# Patient Record
Sex: FEMALE | Race: WHITE | NOT HISPANIC OR LATINO | Employment: UNEMPLOYED | URBAN - METROPOLITAN AREA
[De-identification: names, ages, dates, MRNs, and addresses within clinical notes are randomized per-mention and may not be internally consistent; named-entity substitution may affect disease eponyms.]

---

## 2024-03-07 ENCOUNTER — OFFICE VISIT (OUTPATIENT)
Dept: OBSTETRICS AND GYNECOLOGY | Facility: CLINIC | Age: 24
End: 2024-03-07
Payer: COMMERCIAL

## 2024-03-07 VITALS
BODY MASS INDEX: 36.09 KG/M2 | HEIGHT: 65 IN | WEIGHT: 216.6 LBS | SYSTOLIC BLOOD PRESSURE: 138 MMHG | DIASTOLIC BLOOD PRESSURE: 100 MMHG

## 2024-03-07 DIAGNOSIS — Z30.46 ENCOUNTER FOR REMOVAL AND REINSERTION OF NEXPLANON: Primary | ICD-10-CM

## 2024-03-07 LAB — PREGNANCY TEST URINE, POC: NEGATIVE

## 2024-03-07 PROCEDURE — 81025 URINE PREGNANCY TEST: CPT | Performed by: ADVANCED PRACTICE MIDWIFE

## 2024-03-07 PROCEDURE — 11983 REMOVE/INSERT DRUG IMPLANT: CPT | Performed by: ADVANCED PRACTICE MIDWIFE

## 2024-03-07 RX ORDER — LIDOCAINE HYDROCHLORIDE 10 MG/ML
5 INJECTION INFILTRATION; PERINEURAL ONCE
Status: COMPLETED | OUTPATIENT
Start: 2024-03-07 | End: 2024-03-07

## 2024-03-07 RX ADMIN — LIDOCAINE HYDROCHLORIDE 50 MG: 10 INJECTION INFILTRATION; PERINEURAL at 11:02

## 2024-03-07 ASSESSMENT — PAIN SCALES - GENERAL: PAINLEVEL_OUTOF10: 0 - NO PAIN

## 2024-03-07 NOTE — PROGRESS NOTES
NEXPLANON REMOVAL INSERTION NOTE      Patient's pre-procedure questions were answered and a written informed consent form was signed.   The proximal and distal ends of the Nexplanon device were identified in the patient's upper extremity, they were then marked with a pen.  The site was cleansed with ChloraPrep.  The site was anesthetized with 1 mL of 1% lidocaine with epinephrine was inserted just below the distal tip, with the aid of compression of the proximal tip to elevate the distal tip of the Nexplanon device.  Once this was done the planned incision site was checked for sensation and found to be adequately anesthetized.  A 3 mm incision was made over the distal tip of the Nexplanon wes.  After this was done the proximal end of the Nexplanon wes was compressed to bring the distal tip to the opening.  Any present scar tissue superior to the tip of the Nexplanon wes tip was lysed sharply with a scalpel.  The device was then extruded through the skin incision.  The Nexplanon wes was then able to grasped with a hemostat and it was removed intact without difficulty and was intact.  Upon removal the Nexplanon device was inspected and it was fully intact      PEDRO LUIS Hernandez      NEXPLANON INSERTION PROCEDURE NOTE      Patient's pre-procedure questions were answered and a written informed consent form was signed.   Urine hCG negative.  Patient placed supine with arm flexed and hand up above her head.  Insertion site prepped with ChloraPrep.  Site anesthetized with 5mL of 1% lidocaine.  The skin was checked for sensation and found to be adequately anesthetized.  Needle of Nexplanon applicator device inserted at the marked site at a slight angle, then once the skin was punctured the device was lowered to a horizontal position parallel to the arm and with superior traction the needle was completely inserted in the subcuticular space until applicator shaft touched the skin.  The Nexplanon was then unloaded  from the applicator device and the applicator was removed from the field.  Inspection of the applicator device revealed the Nexplanon to be absent.  The Nexplanon device was easily palpated by both myself and the patient in the arm.  The site was closed with a Band-Aid.  A compressive gauze wrap was placed around the arm to prevent bruising.  The patient tolerated the procedure well.  There were no complications.  EBL minimal.    JESSICA Hernandez-VILMA

## 2025-09-10 ENCOUNTER — APPOINTMENT (OUTPATIENT)
Dept: OBSTETRICS AND GYNECOLOGY | Facility: CLINIC | Age: 25
End: 2025-09-10
Payer: COMMERCIAL